# Patient Record
Sex: FEMALE | Race: WHITE | NOT HISPANIC OR LATINO | Employment: UNEMPLOYED | URBAN - METROPOLITAN AREA
[De-identification: names, ages, dates, MRNs, and addresses within clinical notes are randomized per-mention and may not be internally consistent; named-entity substitution may affect disease eponyms.]

---

## 2018-12-26 ENCOUNTER — APPOINTMENT (EMERGENCY)
Dept: RADIOLOGY | Facility: HOSPITAL | Age: 23
End: 2018-12-26
Payer: MEDICAID

## 2018-12-26 ENCOUNTER — HOSPITAL ENCOUNTER (EMERGENCY)
Facility: HOSPITAL | Age: 23
Discharge: HOME | End: 2018-12-26
Attending: EMERGENCY MEDICINE | Admitting: EMERGENCY MEDICINE
Payer: MEDICAID

## 2018-12-26 VITALS
SYSTOLIC BLOOD PRESSURE: 116 MMHG | RESPIRATION RATE: 18 BRPM | WEIGHT: 165.34 LBS | HEART RATE: 88 BPM | DIASTOLIC BLOOD PRESSURE: 69 MMHG | TEMPERATURE: 98.2 F | OXYGEN SATURATION: 100 %

## 2018-12-26 DIAGNOSIS — S83.92XA LEFT KNEE SPRAIN: Primary | ICD-10-CM

## 2018-12-26 PROCEDURE — 73564 X-RAY EXAM KNEE 4 OR MORE: CPT

## 2018-12-26 PROCEDURE — 99283 EMERGENCY DEPT VISIT LOW MDM: CPT

## 2018-12-26 RX ORDER — HYDROCODONE BITARTRATE AND ACETAMINOPHEN 5; 325 MG/1; MG/1
1 TABLET ORAL ONCE
Status: COMPLETED | OUTPATIENT
Start: 2018-12-26 | End: 2018-12-26

## 2018-12-26 RX ORDER — IBUPROFEN 400 MG/1
400 TABLET ORAL ONCE
Status: COMPLETED | OUTPATIENT
Start: 2018-12-26 | End: 2018-12-26

## 2018-12-26 RX ORDER — IBUPROFEN 600 MG/1
600 TABLET ORAL EVERY 8 HOURS PRN
Qty: 30 TABLET | Refills: 0 | Status: SHIPPED | OUTPATIENT
Start: 2018-12-26 | End: 2021-08-07

## 2018-12-26 RX ADMIN — HYDROCODONE BITARTRATE AND ACETAMINOPHEN 1 TABLET: 5; 325 TABLET ORAL at 17:18

## 2018-12-26 RX ADMIN — IBUPROFEN 400 MG: 400 TABLET ORAL at 16:28

## 2018-12-26 NOTE — DISCHARGE INSTRUCTIONS
Knee Sprain   WHAT YOU NEED TO KNOW:   A knee sprain occurs when one or more ligaments in your knee are suddenly stretched or torn  Ligaments are tissues that hold bones together  Ligaments support the knee and keep the joint and bones in the correct position  DISCHARGE INSTRUCTIONS:   Return to the emergency department if:   · Any part of your leg feels cold, numb, or looks pale     Contact your healthcare provider if:   · You have new or increased swelling, bruising, or pain in your knee  · Your symptoms do not improve within 6 weeks, even with treatment  · You have questions or concerns about your condition or care  Medicines:   · NSAIDs , such as ibuprofen, help decrease swelling, pain, and fever  This medicine is available with or without a doctor's order  NSAIDs can cause stomach bleeding or kidney problems in certain people  If you take blood thinner medicine, always ask your healthcare provider if NSAIDs are safe for you  Always read the medicine label and follow directions  · Acetaminophen  decreases pain and fever  It is available without a doctor's order  Ask how much to take and how often to take it  Follow directions  Read the labels of all other medicines you are using to see if they also contain acetaminophen, or ask your doctor or pharmacist  Acetaminophen can cause liver damage if not taken correctly  Do not use more than 4 grams (4,000 milligrams) total of acetaminophen in one day  · Prescription pain medicine  may be given  Ask how to take this medicine safely  · Take your medicine as directed  Contact your healthcare provider if you think your medicine is not helping or if you have side effects  Tell him or her if you are allergic to any medicine  Keep a list of the medicines, vitamins, and herbs you take  Include the amounts, and when and why you take them  Bring the list or the pill bottles to follow-up visits   Carry your medicine list with you in case of an emergency  Self-care:   · Rest  your knee and do not exercise  You may be told to keep weight off your knee  This means that you should not walk on your injured leg  Rest helps decrease swelling and allows the injury to heal  You can do gentle range of motion (ROM) exercises as directed  This will prevent stiffness  · Apply ice  on your knee for 15 to 20 minutes every hour or as directed  Use an ice pack, or put crushed ice in a plastic bag  Cover it with a towel  Ice helps prevent tissue damage and decreases swelling and pain  · Apply compression to your knee as directed  You may need to wear an elastic bandage  This helps keep your injured knee from moving too much while it heals  You can loosen or tighten the elastic bandage to make it comfortable  It should be tight enough for you to feel support  It should not be so tight that it causes your toes to feel numb or tingly  If you are wearing an elastic bandage, take it off and rewrap it once a day  · Elevate your knee  above the level of your heart as often as you can  This will help decrease swelling and pain  Prop your leg on pillows or blankets to keep it elevated comfortably  Do not put pillows directly behind your knee  · Use support devices as directed:  Support devices such as a splint or brace may be needed  These devices limit movement and protect your joint while it heals  You may be given crutches to use until you can stand on your injured leg without pain  Use devices as directed  Physical therapy:  A physical therapist teaches you exercises to help improve movement and strength, and to decrease pain  Prevent another knee sprain:  Exercise your legs to keep your muscles strong  Strong leg muscles help protect your knee and prevent strain  The following may also prevent a knee sprain:  · Slowly start your exercise or training program   Slowly increase the time, distance, and intensity of your exercise   Sudden increases in training may cause you to injure your knee again  · Wear protective braces and equipment as directed  Braces may prevent your knee from moving the wrong way and causing another sprain  Protective equipment may support your bones and ligaments to prevent injury  · Warm up and stretch before exercise  Warm up by walking or using an exercise bike before starting your regular exercise  Do gentle stretches after warming up  This helps to loosen your muscles and decrease stress on your knee  Cool down and stretch after you exercise  · Wear shoes that fit correctly and support your feet  Replace your running or exercise shoes before the padding or shock absorption is worn out  Ask your healthcare provider which exercise shoes are best for you  Ask if you should wear special shoe inserts  Shoe inserts can help support your heels and arches or keep your foot lined up correctly in your shoes  Exercise on flat surfaces  Follow up with your healthcare provider as directed:  Write down your questions so you remember to ask them during your visits  © 2017 [facility] [facility] Information is for End User's use only and may not be sold, redistributed or otherwise used for commercial purposes  All illustrations and images included in CareNotes® are the copyrighted property of [employer] , [facility]  or Thaddeus Mazariegos  The above information is an  only  It is not intended as medical advice for individual conditions or treatments  Talk to your doctor, nurse or pharmacist before following any medical regimen to see if it is safe and effective for you

## 2018-12-26 NOTE — ED PROVIDER NOTES
History  Chief Complaint   Patient presents with    Knee Injury     pt tripped on the stairs and fell down about 5 steps  Left knee injury  21yo female who presents to ER for evaluation of left knee pain and injury  States this morning around 8:00 a m  She fell down 5 steps at home  Patient states her leg twisted and she landed on the knee wrong  States that she is not been able walk on it or bear weight  She denies other injury  Admits to mild swelling  She has tried massaging it without relief  Denies previous left knee injuries  History provided by:  Patient      None       History reviewed  No pertinent past medical history  History reviewed  No pertinent surgical history  History reviewed  No pertinent family history  I have reviewed and agree with the history as documented  Social History   Substance Use Topics    Smoking status: Current Every Day Smoker    Smokeless tobacco: Never Used    Alcohol use Yes        Review of Systems   Constitutional: Negative for chills and fever  Musculoskeletal: Positive for joint swelling  Skin: Negative for rash and wound  Neurological: Negative for weakness and numbness  Physical Exam  Physical Exam   Constitutional: She appears well-developed and well-nourished  Cardiovascular: Intact distal pulses  Musculoskeletal:        Left hip: Normal         Left knee: She exhibits decreased range of motion, swelling (mild) and bony tenderness  She exhibits no effusion, no ecchymosis, no deformity, no laceration, no erythema, normal alignment, no LCL laxity, normal patellar mobility and no MCL laxity  Tenderness found  Medial joint line and MCL tenderness noted  Left ankle: Normal    Negative Lachman's test   Difficult to complete Kalina's test due to level of pain and decreased range of motion  Skin: Skin is warm and dry  Capillary refill takes less than 2 seconds  Psychiatric: She has a normal mood and affect     Nursing note and vitals reviewed  Vital Signs  ED Triage Vitals [12/26/18 1623]   Temperature Pulse Respirations Blood Pressure SpO2   98 2 °F (36 8 °C) 88 18 116/69 100 %      Temp Source Heart Rate Source Patient Position - Orthostatic VS BP Location FiO2 (%)   Oral Monitor Sitting Left arm --      Pain Score       Worst Possible Pain           Vitals:    12/26/18 1623   BP: 116/69   Pulse: 88   Patient Position - Orthostatic VS: Sitting       Visual Acuity      ED Medications  Medications   ibuprofen (MOTRIN) tablet 400 mg (400 mg Oral Given 12/26/18 1628)   HYDROcodone-acetaminophen (NORCO) 5-325 mg per tablet 1 tablet (1 tablet Oral Given 12/26/18 1718)       Diagnostic Studies  Results Reviewed     None                 XR knee 4+ views LEFT    (Results Pending)              Procedures  Procedures       Phone Contacts  ED Phone Contact    ED Course                               MDM  Number of Diagnoses or Management Options  Left knee sprain:      Amount and/or Complexity of Data Reviewed  Tests in the radiology section of CPT®: ordered and reviewed    Risk of Complications, Morbidity, and/or Mortality  General comments: Differential diagnosis includes but is not limited to:  Sprain, strain, meniscal injury, contusion    Discussed with patient rest, ice, elevation, compression, muscle creams    Patient Progress  Patient progress: stable    CritCare Time    Disposition  Final diagnoses:   Left knee sprain     Time reflects when diagnosis was documented in both MDM as applicable and the Disposition within this note     Time User Action Codes Description Comment    12/26/2018  5:07 PM Mario Araujo Add Amando Hager  92XA] Left knee sprain       ED Disposition     ED Disposition Condition Comment    Discharge  [address] discharge to home/self care      Condition at discharge: Good        Follow-up Information     Follow up With Specialties Details Why Contact Info Additional [address] Emergency Department Emergency Medicine  If symptoms worsen [address] [address]  [phone] [facility] ED, [address], [place], [place], [address] [facility] In 3 days  [address] [facility] [address]  [facility], [address] [place], [place], [address]          Patient's Medications   Discharge Prescriptions    IBUPROFEN (MOTRIN) 600 MG TABLET    Take 1 tablet (600 mg total) by mouth every 8 (eight) hours as needed for mild pain or moderate pain (with food) for up to 7 days       Start Date: 12/26/2018End Date: 1/2/2019       Order Dose: 600 mg       Quantity: 30 tablet    Refills: 0     No discharge procedures on file      ED Provider  Electronically Signed by           Juana Munoz PA-C  12/26/18 7271

## 2021-03-12 ENCOUNTER — HOSPITAL ENCOUNTER (EMERGENCY)
Facility: HOSPITAL | Age: 26
Discharge: HOME | End: 2021-03-12
Attending: EMERGENCY MEDICINE
Payer: MEDICAID

## 2021-03-12 VITALS
OXYGEN SATURATION: 100 % | TEMPERATURE: 98.1 F | RESPIRATION RATE: 16 BRPM | DIASTOLIC BLOOD PRESSURE: 66 MMHG | SYSTOLIC BLOOD PRESSURE: 114 MMHG | HEART RATE: 95 BPM

## 2021-03-12 DIAGNOSIS — N12 PYELONEPHRITIS: Primary | ICD-10-CM

## 2021-03-12 LAB
BACTERIA UR QL AUTO: ABNORMAL /HPF
BILIRUB UR QL STRIP: NEGATIVE
CLARITY UR: CLEAR
COLOR UR: YELLOW
EXT PREG TEST URINE: NEGATIVE
EXT. CONTROL ED NAV: NORMAL
GLUCOSE UR STRIP-MCNC: NEGATIVE MG/DL
HGB UR QL STRIP.AUTO: NEGATIVE
KETONES UR STRIP-MCNC: NEGATIVE MG/DL
LEUKOCYTE ESTERASE UR QL STRIP: ABNORMAL
NITRITE UR QL STRIP: NEGATIVE
NON-SQ EPI CELLS URNS QL MICRO: ABNORMAL /HPF
PH UR STRIP.AUTO: 7 [PH] (ref 4.5–8)
PROT UR STRIP-MCNC: NEGATIVE MG/DL
RBC #/AREA URNS AUTO: ABNORMAL /HPF
SP GR UR STRIP.AUTO: 1.02 (ref 1–1.03)
UROBILINOGEN UR QL STRIP.AUTO: 0.2 E.U./DL
WBC #/AREA URNS AUTO: ABNORMAL /HPF

## 2021-03-12 PROCEDURE — 87077 CULTURE AEROBIC IDENTIFY: CPT

## 2021-03-12 PROCEDURE — 81025 URINE PREGNANCY TEST: CPT | Performed by: EMERGENCY MEDICINE

## 2021-03-12 PROCEDURE — 99284 EMERGENCY DEPT VISIT MOD MDM: CPT | Performed by: EMERGENCY MEDICINE

## 2021-03-12 PROCEDURE — 99283 EMERGENCY DEPT VISIT LOW MDM: CPT

## 2021-03-12 PROCEDURE — 81001 URINALYSIS AUTO W/SCOPE: CPT

## 2021-03-12 PROCEDURE — 87086 URINE CULTURE/COLONY COUNT: CPT

## 2021-03-12 PROCEDURE — 87186 SC STD MICRODIL/AGAR DIL: CPT

## 2021-03-12 RX ORDER — CEPHALEXIN 250 MG/1
500 CAPSULE ORAL ONCE
Status: COMPLETED | OUTPATIENT
Start: 2021-03-12 | End: 2021-03-12

## 2021-03-12 RX ORDER — ACETAMINOPHEN 325 MG/1
975 TABLET ORAL ONCE
Status: COMPLETED | OUTPATIENT
Start: 2021-03-12 | End: 2021-03-12

## 2021-03-12 RX ORDER — CEPHALEXIN 500 MG/1
500 CAPSULE ORAL 2 TIMES DAILY
Qty: 19 CAPSULE | Refills: 0 | Status: SHIPPED | OUTPATIENT
Start: 2021-03-12 | End: 2021-03-22

## 2021-03-12 RX ADMIN — CEPHALEXIN 500 MG: 250 CAPSULE ORAL at 19:03

## 2021-03-12 RX ADMIN — ACETAMINOPHEN 975 MG: 325 TABLET, FILM COATED ORAL at 19:03

## 2021-03-13 NOTE — ED PROVIDER NOTES
History  Chief Complaint   Patient presents with    Back Pain     pt reports low back pain "kidney pain" both sides for 5 days  27-year-old female presents to the emergency department for evaluation with back pain  Onset 5 days ago without identified provocation  She gestures to the right mid and low lumbar region and describes having the same pain on the other side of the back  She noted that she needed to slouch been forward to help alleviate the pain  She now notes that the pain intensifies upon laying flat  It is equal on both sides  For this same duration of 5 days she has appreciated mild dysuria  Urine is a bit darker yellow in coloring  She has not appreciated any fevers, nausea, vomiting, abdominal discomfort, genital discomfort or abnormal discharge  LMP was unremarkable on February 21st   History of UTIs previously  No history of stones  She has been taking ibuprofen 800 mg 3 times daily which provides some relief of discomfort  Prior to Admission Medications   Prescriptions Last Dose Informant Patient Reported? Taking?   ibuprofen (MOTRIN) 600 mg tablet   No No   Sig: Take 1 tablet (600 mg total) by mouth every 8 (eight) hours as needed for mild pain or moderate pain (with food) for up to 7 days      Facility-Administered Medications: None       History reviewed  No pertinent past medical history  History reviewed  No pertinent surgical history  History reviewed  No pertinent family history  I have reviewed and agree with the history as documented  E-Cigarette/Vaping    E-Cigarette Use Never User      E-Cigarette/Vaping Substances     Social History     Tobacco Use    Smoking status: Current Every Day Smoker     Packs/day: 0 50    Smokeless tobacco: Never Used   Substance Use Topics    Alcohol use: Yes    Drug use: No       Review of Systems   All other systems reviewed and are negative  Physical Exam  Physical Exam  Vitals signs and nursing note reviewed  Constitutional:       Appearance: Normal appearance  Comments: Appears comfortable at rest though uncomfortable with slight movements of bending forward and relaxing supine   HENT:      Head: Normocephalic  Nose: Nose normal       Mouth/Throat:      Mouth: Mucous membranes are moist    Eyes:      General: No scleral icterus  Extraocular Movements: Extraocular movements intact  Conjunctiva/sclera: Conjunctivae normal    Cardiovascular:      Rate and Rhythm: Normal rate and regular rhythm  Pulmonary:      Effort: Pulmonary effort is normal       Breath sounds: Normal breath sounds  Abdominal:      General: Bowel sounds are normal       Palpations: Abdomen is soft  Tenderness: There is no abdominal tenderness  Musculoskeletal:      Comments: No midline lumbar tenderness  Bilateral CVA region equally tender  Skin:     General: Skin is warm and dry  Neurological:      General: No focal deficit present  Mental Status: She is alert and oriented to person, place, and time     Psychiatric:         Mood and Affect: Mood normal          Behavior: Behavior normal          Vital Signs  ED Triage Vitals [03/12/21 1752]   Temperature Pulse Respirations Blood Pressure SpO2   98 1 °F (36 7 °C) 95 16 114/66 100 %      Temp Source Heart Rate Source Patient Position - Orthostatic VS BP Location FiO2 (%)   Oral Monitor Sitting Left arm --      Pain Score       8           Vitals:    03/12/21 1752   BP: 114/66   Pulse: 95   Patient Position - Orthostatic VS: Sitting         Visual Acuity      ED Medications  Medications   cephalexin (KEFLEX) capsule 500 mg (500 mg Oral Given 3/12/21 1903)   acetaminophen (TYLENOL) tablet 975 mg (975 mg Oral Given 3/12/21 1903)       Diagnostic Studies  Results Reviewed     Procedure Component Value Units Date/Time    POCT pregnancy, urine [[id number]]  (Normal) Resulted: 03/12/21 1920    Lab Status: Final result Updated: 03/12/21 1921     EXT PREG TEST UR (Ref: Negative) Negative     Control valid    Urine Microscopic [[id number]]  (Abnormal) Collected: 03/12/21 1800    Lab Status: Final result Specimen: Urine, Clean Catch Updated: 03/12/21 1836     RBC, UA 0-5 /hpf      WBC, UA 20-30 /hpf      Epithelial Cells Occasional /hpf      Bacteria, UA Occasional /hpf     Urine culture [[id number]] Collected: 03/12/21 1800    Lab Status: In process Specimen: Urine, Clean Catch Updated: 03/12/21 1836    Urine Macroscopic, POC [[id number]]  (Abnormal) Collected: 03/12/21 1800    Lab Status: Final result Specimen: Urine Updated: 03/12/21 1802     Color, UA Yellow     Clarity, UA Clear     pH, UA 7 0     Leukocytes, UA Trace     Nitrite, UA Negative     Protein, UA Negative mg/dl      Glucose, UA Negative mg/dl      Ketones, UA Negative mg/dl      Urobilinogen, UA 0 2 E U /dl      Bilirubin, UA Negative     Blood, UA Negative     Specific Gravity, UA 1 025    Narrative:      CLINITEK RESULT                 No orders to display              Procedures  Procedures         ED Course    patient began experiencing dysuria at the same time flank discomfort was appreciated bilaterally  No sharp nature or lateralization to suggest ureterolithiasis etiology  UA consistent with UTI  Initiating antibiotic  Has already been taking high-dose NSAIDs  Advised use of acetaminophen along with this for pain control  Reviewed signs/symptoms for which to return  MDM    Disposition  Final diagnoses:   Pyelonephritis     Time reflects when diagnosis was documented in both MDM as applicable and the Disposition within this note     Time User Action Codes Description Comment    3/12/2021  6:58 PM Cecilia SOLARES Add [N12] Pyelonephritis       ED Disposition     ED Disposition Condition Date/Time Comment    Discharge Stable Fri Mar 12, 2021  6:58 PM [address] [place] [address] discharge to home/self care              Follow-up Information     Follow up With Specialties Details Why Contact Info Additional Information    [facility] Emergency Medicine Go to  As needed, If symptoms worsen [address] [facility] [address] [facility], [address], [place], [place], [address]  Call  To obtain listing of primary care providers near you  Schedule an appointment with 1 to establish care and for follow-up of this infection [phone]             Discharge Medication List as of 3/12/2021  7:25 PM      START taking these medications    Details   cephalexin (KEFLEX) 500 mg capsule Take 1 capsule (500 mg total) by mouth 2 (two) times a day for 10 days, Starting Fri 3/12/2021, Until Mon 3/22/2021, Normal         CONTINUE these medications which have NOT CHANGED    Details   ibuprofen (MOTRIN) 600 mg tablet Take 1 tablet (600 mg total) by mouth every 8 (eight) hours as needed for mild pain or moderate pain (with food) for up to 7 days, Starting Wed 12/26/2018, Until Wed 1/2/2019, Print           No discharge procedures on file      PDMP Review     None          ED Provider  Electronically Signed by           Juanito Stone MD  03/12/21 2053

## 2021-03-13 NOTE — DISCHARGE INSTRUCTIONS
You may continue taking ibuprofen 800 mg orally 3 times daily to help with pain  You may additionally take acetaminophen 975 mg or 1000 mg orally every 6 hours for the next few days if needed  Take cephalexin 500 mg orally twice daily for the next 10 days to treat urinary tract infection/kidney infection  Return to the emergency department if you experience worsening including nausea and vomiting with inability to keep down antibiotics, intensifying pain or ongoing fevers

## 2021-03-14 LAB — BACTERIA UR CULT: ABNORMAL

## 2021-08-07 ENCOUNTER — HOSPITAL ENCOUNTER (EMERGENCY)
Facility: HOSPITAL | Age: 26
Discharge: HOME | End: 2021-08-07
Attending: EMERGENCY MEDICINE | Admitting: EMERGENCY MEDICINE
Payer: MEDICAID

## 2021-08-07 VITALS
BODY MASS INDEX: 39.56 KG/M2 | HEART RATE: 87 BPM | OXYGEN SATURATION: 98 % | RESPIRATION RATE: 18 BRPM | WEIGHT: 201.5 LBS | SYSTOLIC BLOOD PRESSURE: 119 MMHG | HEIGHT: 60 IN | TEMPERATURE: 98.7 F | DIASTOLIC BLOOD PRESSURE: 64 MMHG

## 2021-08-07 DIAGNOSIS — T23.169A: Primary | ICD-10-CM

## 2021-08-07 PROCEDURE — 99284 EMERGENCY DEPT VISIT MOD MDM: CPT | Performed by: EMERGENCY MEDICINE

## 2021-08-07 PROCEDURE — 99283 EMERGENCY DEPT VISIT LOW MDM: CPT

## 2021-08-07 PROCEDURE — 96372 THER/PROPH/DIAG INJ SC/IM: CPT

## 2021-08-07 RX ORDER — NAPROXEN 500 MG/1
500 TABLET ORAL 2 TIMES DAILY WITH MEALS
Qty: 20 TABLET | Refills: 0 | Status: SHIPPED | OUTPATIENT
Start: 2021-08-07 | End: 2021-08-17

## 2021-08-07 RX ORDER — GINSENG 100 MG
1 CAPSULE ORAL ONCE
Status: COMPLETED | OUTPATIENT
Start: 2021-08-07 | End: 2021-08-07

## 2021-08-07 RX ORDER — HYDROCODONE BITARTRATE AND ACETAMINOPHEN 5; 325 MG/1; MG/1
1 TABLET ORAL EVERY 6 HOURS PRN
Qty: 20 TABLET | Refills: 0 | Status: SHIPPED | OUTPATIENT
Start: 2021-08-07 | End: 2021-08-12

## 2021-08-07 RX ORDER — KETOROLAC TROMETHAMINE 30 MG/ML
30 INJECTION, SOLUTION INTRAMUSCULAR; INTRAVENOUS ONCE
Status: COMPLETED | OUTPATIENT
Start: 2021-08-07 | End: 2021-08-07

## 2021-08-07 RX ADMIN — BACITRACIN ZINC 1 LARGE APPLICATION: 500 OINTMENT TOPICAL at 22:17

## 2021-08-07 RX ADMIN — KETOROLAC TROMETHAMINE 30 MG: 30 INJECTION, SOLUTION INTRAMUSCULAR; INTRAVENOUS at 22:35

## 2021-08-08 NOTE — ED NOTES
Pt refused all vitals stating "it will make my hands hurt more "     Jose Poe, LAZARO  08/07/21 5252
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
## 2021-08-08 NOTE — ED NOTES
Pt and significant other complaining about when pt will be seen, pt reporting she is in severe pain, offered patient ice packs and Tylenol  Pt given ice packs and declined Tylenol       Acacia Castañeda RN  08/07/21 6717

## 2021-08-08 NOTE — ED NOTES
Pt significant other repeating asking multiple nurses when pt will be evaluated, pt significant other advised that pt will be seen at soon as a doctor is available  Security at bedside, advising significant other that he needs to remain quiet and pt will be seen by a provider as soon as one is available        Patrica Be RN  08/07/21 [id number]

## 2021-08-12 ENCOUNTER — OFFICE VISIT (OUTPATIENT)
Dept: URGENT CARE | Facility: MEDICAL CENTER | Age: 26
End: 2021-08-12
Payer: MEDICAID

## 2021-08-12 VITALS
RESPIRATION RATE: 18 BRPM | WEIGHT: 200 LBS | TEMPERATURE: 97.1 F | DIASTOLIC BLOOD PRESSURE: 77 MMHG | OXYGEN SATURATION: 99 % | SYSTOLIC BLOOD PRESSURE: 126 MMHG | HEIGHT: 60 IN | HEART RATE: 89 BPM | BODY MASS INDEX: 39.27 KG/M2

## 2021-08-12 DIAGNOSIS — T23.061D BURN OF BACK OF RIGHT HAND, UNSPECIFIED BURN DEGREE, SUBSEQUENT ENCOUNTER: Primary | ICD-10-CM

## 2021-08-12 DIAGNOSIS — T23.062D: ICD-10-CM

## 2021-08-12 PROCEDURE — G0382 LEV 3 HOSP TYPE B ED VISIT: HCPCS | Performed by: PHYSICIAN ASSISTANT

## 2021-08-12 RX ORDER — CEPHALEXIN 500 MG/1
500 CAPSULE ORAL EVERY 8 HOURS SCHEDULED
Qty: 21 CAPSULE | Refills: 0 | Status: SHIPPED | OUTPATIENT
Start: 2021-08-12 | End: 2021-08-19
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
## 2022-03-17 ENCOUNTER — OFFICE VISIT (OUTPATIENT)
Dept: INTERNAL MEDICINE CLINIC | Facility: CLINIC | Age: 27
End: 2022-03-17
Payer: COMMERCIAL

## 2022-03-17 VITALS
RESPIRATION RATE: 14 BRPM | WEIGHT: 208.4 LBS | SYSTOLIC BLOOD PRESSURE: 122 MMHG | TEMPERATURE: 98.2 F | BODY MASS INDEX: 40.91 KG/M2 | HEIGHT: 60 IN | HEART RATE: 86 BPM | DIASTOLIC BLOOD PRESSURE: 80 MMHG

## 2022-03-17 DIAGNOSIS — I10 HYPERTENSION, ESSENTIAL, BENIGN: ICD-10-CM

## 2022-03-17 DIAGNOSIS — M08.00 JUVENILE RHEUMATOID ARTHRITIS (HCC): ICD-10-CM

## 2022-03-17 DIAGNOSIS — Z12.4 CERVICAL CANCER SCREENING: ICD-10-CM

## 2022-03-17 DIAGNOSIS — F17.200 SMOKER: ICD-10-CM

## 2022-03-17 DIAGNOSIS — E66.01 MORBID OBESITY (HCC): ICD-10-CM

## 2022-03-17 DIAGNOSIS — Z00.00 ANNUAL PHYSICAL EXAM: Primary | ICD-10-CM

## 2022-03-17 PROCEDURE — 99385 PREV VISIT NEW AGE 18-39: CPT | Performed by: INTERNAL MEDICINE

## 2022-03-17 NOTE — PATIENT INSTRUCTIONS

## 2022-03-17 NOTE — PROGRESS NOTES
[facility]    NAME: Susana Keller  AGE: 32 y o  SEX: female  : 1995     DATE: 3/24/2022     Assessment and Plan:     Problem List Items Addressed This Visit        Cardiovascular and Mediastinum    Hypertension, essential, benign    Relevant Orders    CBC and Platelet (Completed)    Comprehensive metabolic panel (Completed)    UA w Reflex to Microscopic w Reflex to Culture -Lab Collect       Musculoskeletal and Integument    Juvenile rheumatoid [facility] ([facility] )    Relevant Orders    CBC and Platelet (Completed)    Comprehensive metabolic panel (Completed)    UA w Reflex to Microscopic w Reflex to Culture -Lab Collect    Sedimentation rate, automated (Completed)    C-reactive protein (Completed)    RF Screen w/ Reflex to Titer (Completed)    Cyclic citrul peptide antibody, IgG (Completed)    Ambulatory Referral to Rheumatology       Other    Morbid obesity ([facility] )    Relevant Orders    HEMOGLOBIN A1C W/ EAG ESTIMATION (Completed)    Lipid Panel with Direct LDL reflex (Completed)    TSH, 3rd generation (Completed)      Other Visit Diagnoses     Annual physical exam    -  Primary    Cervical cancer screening        Relevant Orders    Ambulatory Referral to Obstetrics / Gynecology    Smoker              Immunizations and preventive care screenings were discussed with patient today  Appropriate education was printed on patient's after visit summary  Counseling:  Dental Health: discussed importance of regular tooth brushing, flossing, and dental visits  · Exercise: the importance of regular exercise/physical activity was discussed  Recommend exercise 3-5 times per week for at least 30 minutes  BMI Counseling: Body mass index is 40 7 kg/m²  The BMI is above normal  Nutrition recommendations include decreasing overall calorie intake  Return in about 4 weeks (around 2022)       Chief Complaint:     Chief Complaint   Patient presents with   [facility] Patient Visit     drivers form      History of Present Illness:     Adult Annual Physical   Patient here for a comprehensive physical exam  The patient reports problems - needs learners permit form filled out, arthritis as a child, high BP   RICCI marino 2 yo received tx until 8 and was in remission for 10 years  For the past 8 years experiencing pain in the knees and feet; +morning stiffness; taking Tylenol  -170-90 for few weeks  She started checking bec of palpitations, headaches    Diet and Physical Activity  · Diet/Nutrition: poor diet  40lbs weight gain in the past 2 years  · Exercise: no formal exercise  Depression Screening  PHQ-2/9 Depression Screening    Little interest or pleasure in doing things: 0 - not at all  Feeling down, depressed, or hopeless: 0 - not at all  PHQ-2 Score: 0  PHQ-2 Interpretation: Negative depression screen       General Health  · Sleep: sleeps well  · Hearing: normal - bilateral   · Vision: no vision problems  · Dental: no dental visits for >1 year  /GYN Health  · Last menstrual period: 3/17/2022  · Contraceptive method: none  · History of STDs?: no      Review of Systems:     Review of Systems   Constitutional: Positive for unexpected weight change (weight gain)  Negative for chills and fever  HENT:        [place] tooth right lower coming out and painful   Respiratory: Positive for shortness of breath  Negative for cough  Cardiovascular: Positive for chest pain, palpitations and leg swelling  Gastrointestinal: Negative for abdominal pain, constipation and diarrhea  Genitourinary: Negative for difficulty urinating and menstrual problem  Musculoskeletal: Positive for arthralgias and joint swelling        Past Medical History:     Past Medical History:   Diagnosis Date    Rheumatoid [facility] ([facility] )       Past Surgical History:     Past Surgical History:   Procedure Laterality Date     SECTION       SECTION        Social History:     Social History     Socioeconomic History    Marital status: Single     Spouse name: None    Number of children: None    Years of education: None    Highest education level: None   Occupational History    None   Tobacco Use    Smoking status: Current Every Day Smoker     Packs/day: 1 00     Years: 8 00     Pack years: 8 00     Types: Cigarettes     Start date: 3/17/2014    Smokeless tobacco: Never Used   Vaping Use    Vaping Use: Never used   Substance and Sexual Activity    Alcohol use: Not Currently    Drug use: No    Sexual activity: Yes   Other Topics Concern    None   Social History Narrative    None     Social Determinants of Health     Financial Resource Strain: Not on file   Food Insecurity: Not on file   Transportation Needs: Not on file   Physical Activity: Not on file   Stress: Not on file   Social Connections: Not on file   Intimate Partner Violence: Not on file   Housing Stability: Not on file      Family History:     Family History   Problem Relation Age of Onset    Coronary artery disease Father     Stroke Father     Diabetes Father     Hypertension Father     Heart disease Maternal Grandmother     Hypertension Maternal Grandmother     Thyroid cancer Maternal Grandmother     Hyperlipidemia Maternal Grandmother     Lung cancer Paternal Grandfather       Current Medications:     Current Outpatient Medications   Medication Sig Dispense Refill    naproxen (NAPROSYN) 500 mg tablet Take 1 tablet (500 mg total) by mouth 2 (two) times a day with meals for 10 days 20 tablet 0     No current facility-administered medications for this visit  Allergies:     No Known Allergies   Physical Exam:     /80 (BP Location: Right arm)   Pulse 86   Temp 98 2 °F (36 8 °C)   Resp 14   Ht 5' (1 524 m)   Wt 94 5 kg (208 lb 6 4 oz)   BMI 40 70 kg/m²     Physical Exam  Constitutional:       General: She is not in acute distress       Appearance: She is well-developed  She is obese  She is not ill-appearing, toxic-appearing or diaphoretic  HENT:      Head: Normocephalic and atraumatic  Right Ear: External ear normal  There is no impacted cerumen  Left Ear: External ear normal  There is no impacted cerumen  Eyes:      Conjunctiva/sclera: Conjunctivae normal    Cardiovascular:      Rate and Rhythm: Normal rate and regular rhythm  Heart sounds: Normal heart sounds  No murmur heard  Pulmonary:      Effort: Pulmonary effort is normal  No respiratory distress  Breath sounds: Normal breath sounds  No stridor  No wheezing or rales  Abdominal:      General: There is no distension  Palpations: Abdomen is soft  There is no mass  Tenderness: There is no abdominal tenderness  There is no guarding or rebound  Musculoskeletal:         General: No swelling  Cervical back: Neck supple  Right lower leg: No edema  Left lower leg: No edema  Skin:     General: Skin is warm and dry  Neurological:      Mental Status: She is alert and oriented to person, place, and time  Psychiatric:         Mood and Affect: Mood normal          Behavior: Behavior normal          Thought Content:  Thought content normal          Judgment: Judgment normal           Bigg Louie MD   [facility]

## 2022-03-18 ENCOUNTER — APPOINTMENT (OUTPATIENT)
Dept: LAB | Facility: CLINIC | Age: 27
End: 2022-03-18
Payer: COMMERCIAL

## 2022-03-18 DIAGNOSIS — E66.01 MORBID OBESITY (HCC): ICD-10-CM

## 2022-03-18 DIAGNOSIS — I10 HYPERTENSION, ESSENTIAL, BENIGN: ICD-10-CM

## 2022-03-18 DIAGNOSIS — M08.00 JUVENILE RHEUMATOID ARTHRITIS (HCC): ICD-10-CM

## 2022-03-18 LAB
ALBUMIN SERPL BCP-MCNC: 3.5 G/DL (ref 3.5–5)
ALP SERPL-CCNC: 88 U/L (ref 46–116)
ALT SERPL W P-5'-P-CCNC: 20 U/L (ref 12–78)
ANION GAP SERPL CALCULATED.3IONS-SCNC: 6 MMOL/L (ref 4–13)
AST SERPL W P-5'-P-CCNC: 12 U/L (ref 5–45)
BILIRUB SERPL-MCNC: 0.2 MG/DL (ref 0.2–1)
BUN SERPL-MCNC: 14 MG/DL (ref 5–25)
CALCIUM SERPL-MCNC: 8.7 MG/DL (ref 8.3–10.1)
CHLORIDE SERPL-SCNC: 111 MMOL/L (ref 100–108)
CHOLEST SERPL-MCNC: 159 MG/DL
CO2 SERPL-SCNC: 22 MMOL/L (ref 21–32)
CREAT SERPL-MCNC: 0.69 MG/DL (ref 0.6–1.3)
CRP SERPL QL: 23.2 MG/L
ERYTHROCYTE [DISTWIDTH] IN BLOOD BY AUTOMATED COUNT: 15.9 % (ref 11.6–15.1)
ERYTHROCYTE [SEDIMENTATION RATE] IN BLOOD: 71 MM/HOUR (ref 0–19)
EST. AVERAGE GLUCOSE BLD GHB EST-MCNC: 105 MG/DL
GFR SERPL CREATININE-BSD FRML MDRD: 120 ML/MIN/1.73SQ M
GLUCOSE P FAST SERPL-MCNC: 97 MG/DL (ref 65–99)
HBA1C MFR BLD: 5.3 %
HCT VFR BLD AUTO: 39.7 % (ref 34.8–46.1)
HDLC SERPL-MCNC: 27 MG/DL
HGB BLD-MCNC: 11.7 G/DL (ref 11.5–15.4)
LDLC SERPL CALC-MCNC: 114 MG/DL (ref 0–100)
MCH RBC QN AUTO: 22.9 PG (ref 26.8–34.3)
MCHC RBC AUTO-ENTMCNC: 29.5 G/DL (ref 31.4–37.4)
MCV RBC AUTO: 78 FL (ref 82–98)
PLATELET # BLD AUTO: 287 THOUSANDS/UL (ref 149–390)
PMV BLD AUTO: 10.3 FL (ref 8.9–12.7)
POTASSIUM SERPL-SCNC: 4 MMOL/L (ref 3.5–5.3)
PROT SERPL-MCNC: 7.6 G/DL (ref 6.4–8.2)
RBC # BLD AUTO: 5.1 MILLION/UL (ref 3.81–5.12)
SODIUM SERPL-SCNC: 139 MMOL/L (ref 136–145)
TRIGL SERPL-MCNC: 90 MG/DL
TSH SERPL DL<=0.05 MIU/L-ACNC: 0.84 UIU/ML (ref 0.36–3.74)
WBC # BLD AUTO: 10.01 THOUSAND/UL (ref 4.31–10.16)

## 2022-03-18 PROCEDURE — 84443 ASSAY THYROID STIM HORMONE: CPT

## 2022-03-18 PROCEDURE — 85027 COMPLETE CBC AUTOMATED: CPT

## 2022-03-18 PROCEDURE — 80061 LIPID PANEL: CPT

## 2022-03-18 PROCEDURE — 86430 RHEUMATOID FACTOR TEST QUAL: CPT

## 2022-03-18 PROCEDURE — 86140 C-REACTIVE PROTEIN: CPT

## 2022-03-18 PROCEDURE — 80053 COMPREHEN METABOLIC PANEL: CPT

## 2022-03-18 PROCEDURE — 86200 CCP ANTIBODY: CPT

## 2022-03-18 PROCEDURE — 85652 RBC SED RATE AUTOMATED: CPT

## 2022-03-18 PROCEDURE — 83036 HEMOGLOBIN GLYCOSYLATED A1C: CPT

## 2022-03-18 PROCEDURE — 36415 COLL VENOUS BLD VENIPUNCTURE: CPT

## 2022-03-21 LAB — RHEUMATOID FACT SER QL LA: NEGATIVE

## 2022-03-22 LAB — CCP AB SER IA-ACNC: 2.3

## 2022-04-29 ENCOUNTER — VBI (OUTPATIENT)
Dept: ADMINISTRATIVE | Facility: OTHER | Age: 27
End: 2022-04-29

## 2023-02-22 ENCOUNTER — VBI (OUTPATIENT)
Dept: ADMINISTRATIVE | Facility: OTHER | Age: 28
End: 2023-02-22

## 2023-03-14 ENCOUNTER — TELEPHONE (OUTPATIENT)
Dept: INTERNAL MEDICINE CLINIC | Facility: CLINIC | Age: 28
End: 2023-03-14

## 2023-03-14 DIAGNOSIS — I10 HYPERTENSION, ESSENTIAL, BENIGN: Primary | ICD-10-CM

## 2023-03-14 DIAGNOSIS — E66.01 MORBID OBESITY (HCC): ICD-10-CM

## 2023-03-14 DIAGNOSIS — M08.00 JUVENILE RHEUMATOID ARTHRITIS (HCC): ICD-10-CM

## 2023-03-14 NOTE — TELEPHONE ENCOUNTER
The patient is coming in of Friday  She would like to have blood work done before she sees you  Please advise  Thank you

## 2023-03-16 ENCOUNTER — APPOINTMENT (OUTPATIENT)
Dept: LAB | Facility: MEDICAL CENTER | Age: 28
End: 2023-03-16

## 2023-03-16 DIAGNOSIS — E66.01 MORBID OBESITY (HCC): ICD-10-CM

## 2023-03-16 DIAGNOSIS — M08.00 JUVENILE RHEUMATOID ARTHRITIS (HCC): ICD-10-CM

## 2023-03-16 LAB
ALBUMIN SERPL BCP-MCNC: 3.3 G/DL (ref 3.5–5)
ALP SERPL-CCNC: 81 U/L (ref 46–116)
ALT SERPL W P-5'-P-CCNC: 17 U/L (ref 12–78)
ANION GAP SERPL CALCULATED.3IONS-SCNC: 5 MMOL/L (ref 4–13)
AST SERPL W P-5'-P-CCNC: 12 U/L (ref 5–45)
BACTERIA UR QL AUTO: ABNORMAL /HPF
BASOPHILS # BLD AUTO: 0.05 THOUSANDS/ÂΜL (ref 0–0.1)
BASOPHILS NFR BLD AUTO: 1 % (ref 0–1)
BILIRUB SERPL-MCNC: 0.32 MG/DL (ref 0.2–1)
BILIRUB UR QL STRIP: NEGATIVE
BUN SERPL-MCNC: 14 MG/DL (ref 5–25)
CALCIUM ALBUM COR SERPL-MCNC: 9.2 MG/DL (ref 8.3–10.1)
CALCIUM SERPL-MCNC: 8.6 MG/DL (ref 8.3–10.1)
CHLORIDE SERPL-SCNC: 112 MMOL/L (ref 96–108)
CHOLEST SERPL-MCNC: 146 MG/DL
CLARITY UR: CLEAR
CO2 SERPL-SCNC: 22 MMOL/L (ref 21–32)
COLOR UR: YELLOW
CREAT SERPL-MCNC: 0.62 MG/DL (ref 0.6–1.3)
CRP SERPL QL: 23.4 MG/L
EOSINOPHIL # BLD AUTO: 0.14 THOUSAND/ÂΜL (ref 0–0.61)
EOSINOPHIL NFR BLD AUTO: 1 % (ref 0–6)
ERYTHROCYTE [DISTWIDTH] IN BLOOD BY AUTOMATED COUNT: 16.5 % (ref 11.6–15.1)
ERYTHROCYTE [SEDIMENTATION RATE] IN BLOOD: 80 MM/HOUR (ref 0–19)
EST. AVERAGE GLUCOSE BLD GHB EST-MCNC: 111 MG/DL
GFR SERPL CREATININE-BSD FRML MDRD: 123 ML/MIN/1.73SQ M
GLUCOSE P FAST SERPL-MCNC: 90 MG/DL (ref 65–99)
GLUCOSE UR STRIP-MCNC: NEGATIVE MG/DL
HBA1C MFR BLD: 5.5 %
HCT VFR BLD AUTO: 38.9 % (ref 34.8–46.1)
HDLC SERPL-MCNC: 28 MG/DL
HGB BLD-MCNC: 11 G/DL (ref 11.5–15.4)
HGB UR QL STRIP.AUTO: NEGATIVE
IMM GRANULOCYTES # BLD AUTO: 0.02 THOUSAND/UL (ref 0–0.2)
IMM GRANULOCYTES NFR BLD AUTO: 0 % (ref 0–2)
KETONES UR STRIP-MCNC: NEGATIVE MG/DL
LDLC SERPL CALC-MCNC: 100 MG/DL (ref 0–100)
LEUKOCYTE ESTERASE UR QL STRIP: NEGATIVE
LYMPHOCYTES # BLD AUTO: 3.53 THOUSANDS/ÂΜL (ref 0.6–4.47)
LYMPHOCYTES NFR BLD AUTO: 33 % (ref 14–44)
MCH RBC QN AUTO: 21.5 PG (ref 26.8–34.3)
MCHC RBC AUTO-ENTMCNC: 28.3 G/DL (ref 31.4–37.4)
MCV RBC AUTO: 76 FL (ref 82–98)
MONOCYTES # BLD AUTO: 0.56 THOUSAND/ÂΜL (ref 0.17–1.22)
MONOCYTES NFR BLD AUTO: 5 % (ref 4–12)
MUCOUS THREADS UR QL AUTO: ABNORMAL
NEUTROPHILS # BLD AUTO: 6.54 THOUSANDS/ÂΜL (ref 1.85–7.62)
NEUTS SEG NFR BLD AUTO: 60 % (ref 43–75)
NITRITE UR QL STRIP: NEGATIVE
NON-SQ EPI CELLS URNS QL MICRO: ABNORMAL /HPF
NRBC BLD AUTO-RTO: 0 /100 WBCS
PH UR STRIP.AUTO: 6 [PH]
PLATELET # BLD AUTO: 300 THOUSANDS/UL (ref 149–390)
PMV BLD AUTO: 10.4 FL (ref 8.9–12.7)
POTASSIUM SERPL-SCNC: 3.7 MMOL/L (ref 3.5–5.3)
PROT SERPL-MCNC: 7.5 G/DL (ref 6.4–8.4)
PROT UR STRIP-MCNC: ABNORMAL MG/DL
RBC # BLD AUTO: 5.11 MILLION/UL (ref 3.81–5.12)
RBC #/AREA URNS AUTO: ABNORMAL /HPF
SODIUM SERPL-SCNC: 139 MMOL/L (ref 135–147)
SP GR UR STRIP.AUTO: 1.03 (ref 1–1.03)
TRIGL SERPL-MCNC: 91 MG/DL
UROBILINOGEN UR STRIP-ACNC: <2 MG/DL
WBC # BLD AUTO: 10.84 THOUSAND/UL (ref 4.31–10.16)
WBC #/AREA URNS AUTO: ABNORMAL /HPF

## 2023-04-14 DIAGNOSIS — M08.00 JUVENILE RHEUMATOID ARTHRITIS (HCC): Primary | ICD-10-CM

## 2023-05-31 ENCOUNTER — VBI (OUTPATIENT)
Dept: ADMINISTRATIVE | Facility: OTHER | Age: 28
End: 2023-05-31